# Patient Record
Sex: FEMALE | Race: OTHER | URBAN - METROPOLITAN AREA
[De-identification: names, ages, dates, MRNs, and addresses within clinical notes are randomized per-mention and may not be internally consistent; named-entity substitution may affect disease eponyms.]

---

## 2020-07-22 ENCOUNTER — EMERGENCY (EMERGENCY)
Facility: HOSPITAL | Age: 60
LOS: 1 days | Discharge: HOME | End: 2020-07-22
Attending: EMERGENCY MEDICINE
Payer: MEDICAID

## 2020-07-22 VITALS
WEIGHT: 125 LBS | SYSTOLIC BLOOD PRESSURE: 166 MMHG | OXYGEN SATURATION: 97 % | HEART RATE: 82 BPM | HEIGHT: 65 IN | RESPIRATION RATE: 22 BRPM | DIASTOLIC BLOOD PRESSURE: 110 MMHG | TEMPERATURE: 98 F

## 2020-07-22 VITALS
RESPIRATION RATE: 19 BRPM | SYSTOLIC BLOOD PRESSURE: 137 MMHG | OXYGEN SATURATION: 94 % | HEART RATE: 86 BPM | DIASTOLIC BLOOD PRESSURE: 87 MMHG

## 2020-07-22 LAB
ALBUMIN SERPL ELPH-MCNC: 3.5 G/DL — SIGNIFICANT CHANGE UP (ref 3.5–5)
ALP SERPL-CCNC: 100 U/L — SIGNIFICANT CHANGE UP (ref 40–120)
ALT FLD-CCNC: 41 U/L DA — SIGNIFICANT CHANGE UP (ref 10–60)
ANION GAP SERPL CALC-SCNC: 5 MMOL/L — SIGNIFICANT CHANGE UP (ref 5–17)
APTT BLD: 33 SEC — SIGNIFICANT CHANGE UP (ref 27.5–35.5)
AST SERPL-CCNC: 43 U/L — HIGH (ref 10–40)
BASOPHILS # BLD AUTO: 0.03 K/UL — SIGNIFICANT CHANGE UP (ref 0–0.2)
BASOPHILS NFR BLD AUTO: 0.4 % — SIGNIFICANT CHANGE UP (ref 0–2)
BILIRUB SERPL-MCNC: 0.3 MG/DL — SIGNIFICANT CHANGE UP (ref 0.2–1.2)
BUN SERPL-MCNC: 9 MG/DL — SIGNIFICANT CHANGE UP (ref 7–18)
CALCIUM SERPL-MCNC: 8.9 MG/DL — SIGNIFICANT CHANGE UP (ref 8.4–10.5)
CHLORIDE SERPL-SCNC: 105 MMOL/L — SIGNIFICANT CHANGE UP (ref 96–108)
CO2 SERPL-SCNC: 27 MMOL/L — SIGNIFICANT CHANGE UP (ref 22–31)
CREAT SERPL-MCNC: 0.75 MG/DL — SIGNIFICANT CHANGE UP (ref 0.5–1.3)
D DIMER BLD IA.RAPID-MCNC: 152 NG/ML DDU — SIGNIFICANT CHANGE UP
EOSINOPHIL # BLD AUTO: 0.39 K/UL — SIGNIFICANT CHANGE UP (ref 0–0.5)
EOSINOPHIL NFR BLD AUTO: 4.9 % — SIGNIFICANT CHANGE UP (ref 0–6)
GLUCOSE SERPL-MCNC: 104 MG/DL — HIGH (ref 70–99)
HCT VFR BLD CALC: 40.2 % — SIGNIFICANT CHANGE UP (ref 34.5–45)
HGB BLD-MCNC: 12.9 G/DL — SIGNIFICANT CHANGE UP (ref 11.5–15.5)
IMM GRANULOCYTES NFR BLD AUTO: 0.3 % — SIGNIFICANT CHANGE UP (ref 0–1.5)
INR BLD: 1.06 RATIO — SIGNIFICANT CHANGE UP (ref 0.88–1.16)
LACTATE SERPL-SCNC: 0.8 MMOL/L — SIGNIFICANT CHANGE UP (ref 0.7–2)
LYMPHOCYTES # BLD AUTO: 1.64 K/UL — SIGNIFICANT CHANGE UP (ref 1–3.3)
LYMPHOCYTES # BLD AUTO: 20.8 % — SIGNIFICANT CHANGE UP (ref 13–44)
MCHC RBC-ENTMCNC: 30.4 PG — SIGNIFICANT CHANGE UP (ref 27–34)
MCHC RBC-ENTMCNC: 32.1 GM/DL — SIGNIFICANT CHANGE UP (ref 32–36)
MCV RBC AUTO: 94.6 FL — SIGNIFICANT CHANGE UP (ref 80–100)
MONOCYTES # BLD AUTO: 0.74 K/UL — SIGNIFICANT CHANGE UP (ref 0–0.9)
MONOCYTES NFR BLD AUTO: 9.4 % — SIGNIFICANT CHANGE UP (ref 2–14)
NEUTROPHILS # BLD AUTO: 5.06 K/UL — SIGNIFICANT CHANGE UP (ref 1.8–7.4)
NEUTROPHILS NFR BLD AUTO: 64.2 % — SIGNIFICANT CHANGE UP (ref 43–77)
NRBC # BLD: 0 /100 WBCS — SIGNIFICANT CHANGE UP (ref 0–0)
PLATELET # BLD AUTO: 269 K/UL — SIGNIFICANT CHANGE UP (ref 150–400)
POTASSIUM SERPL-MCNC: 3.8 MMOL/L — SIGNIFICANT CHANGE UP (ref 3.5–5.3)
POTASSIUM SERPL-SCNC: 3.8 MMOL/L — SIGNIFICANT CHANGE UP (ref 3.5–5.3)
PROT SERPL-MCNC: 7.7 G/DL — SIGNIFICANT CHANGE UP (ref 6–8.3)
PROTHROM AB SERPL-ACNC: 12.4 SEC — SIGNIFICANT CHANGE UP (ref 10.6–13.6)
RBC # BLD: 4.25 M/UL — SIGNIFICANT CHANGE UP (ref 3.8–5.2)
RBC # FLD: 12.8 % — SIGNIFICANT CHANGE UP (ref 10.3–14.5)
SARS-COV-2 RNA SPEC QL NAA+PROBE: SIGNIFICANT CHANGE UP
SODIUM SERPL-SCNC: 137 MMOL/L — SIGNIFICANT CHANGE UP (ref 135–145)
TROPONIN I SERPL-MCNC: <0.015 NG/ML — SIGNIFICANT CHANGE UP (ref 0–0.04)
TROPONIN I SERPL-MCNC: <0.015 NG/ML — SIGNIFICANT CHANGE UP (ref 0–0.04)
WBC # BLD: 7.88 K/UL — SIGNIFICANT CHANGE UP (ref 3.8–10.5)
WBC # FLD AUTO: 7.88 K/UL — SIGNIFICANT CHANGE UP (ref 3.8–10.5)

## 2020-07-22 PROCEDURE — U0003: CPT

## 2020-07-22 PROCEDURE — 96374 THER/PROPH/DIAG INJ IV PUSH: CPT

## 2020-07-22 PROCEDURE — 71045 X-RAY EXAM CHEST 1 VIEW: CPT | Mod: 26

## 2020-07-22 PROCEDURE — 71045 X-RAY EXAM CHEST 1 VIEW: CPT

## 2020-07-22 PROCEDURE — 99285 EMERGENCY DEPT VISIT HI MDM: CPT | Mod: 25

## 2020-07-22 PROCEDURE — 85610 PROTHROMBIN TIME: CPT

## 2020-07-22 PROCEDURE — 80053 COMPREHEN METABOLIC PANEL: CPT

## 2020-07-22 PROCEDURE — 85730 THROMBOPLASTIN TIME PARTIAL: CPT

## 2020-07-22 PROCEDURE — 83605 ASSAY OF LACTIC ACID: CPT

## 2020-07-22 PROCEDURE — 85379 FIBRIN DEGRADATION QUANT: CPT

## 2020-07-22 PROCEDURE — 36415 COLL VENOUS BLD VENIPUNCTURE: CPT

## 2020-07-22 PROCEDURE — 99285 EMERGENCY DEPT VISIT HI MDM: CPT

## 2020-07-22 PROCEDURE — 94640 AIRWAY INHALATION TREATMENT: CPT

## 2020-07-22 PROCEDURE — 87040 BLOOD CULTURE FOR BACTERIA: CPT

## 2020-07-22 PROCEDURE — 85027 COMPLETE CBC AUTOMATED: CPT

## 2020-07-22 PROCEDURE — 93005 ELECTROCARDIOGRAM TRACING: CPT

## 2020-07-22 PROCEDURE — 84484 ASSAY OF TROPONIN QUANT: CPT

## 2020-07-22 RX ORDER — AZITHROMYCIN 500 MG/1
1 TABLET, FILM COATED ORAL
Qty: 3 | Refills: 0
Start: 2020-07-22

## 2020-07-22 RX ORDER — FLUOXETINE HCL 10 MG
1 CAPSULE ORAL
Qty: 30 | Refills: 0
Start: 2020-07-22 | End: 2020-08-20

## 2020-07-22 RX ORDER — LAMOTRIGINE 25 MG/1
1 TABLET, ORALLY DISINTEGRATING ORAL
Qty: 60 | Refills: 0
Start: 2020-07-22 | End: 2020-08-20

## 2020-07-22 RX ORDER — NICOTINE POLACRILEX 2 MG
1 GUM BUCCAL
Qty: 21 | Refills: 0
Start: 2020-07-22

## 2020-07-22 RX ORDER — IPRATROPIUM/ALBUTEROL SULFATE 18-103MCG
3 AEROSOL WITH ADAPTER (GRAM) INHALATION ONCE
Refills: 0 | Status: COMPLETED | OUTPATIENT
Start: 2020-07-22 | End: 2020-07-22

## 2020-07-22 RX ORDER — SUMATRIPTAN SUCCINATE 4 MG/.5ML
1 INJECTION, SOLUTION SUBCUTANEOUS
Qty: 30 | Refills: 0
Start: 2020-07-22

## 2020-07-22 RX ORDER — DEXTROMETHORPHAN POLISTIREX 30 MG/5 ML
10 SUSPENSION, EXTENDED RELEASE 12 HR ORAL
Qty: 30 | Refills: 0
Start: 2020-07-22

## 2020-07-22 RX ORDER — IPRATROPIUM/ALBUTEROL SULFATE 18-103MCG
3 AEROSOL WITH ADAPTER (GRAM) INHALATION
Qty: 1 | Refills: 0
Start: 2020-07-22 | End: 2020-08-20

## 2020-07-22 RX ORDER — GABAPENTIN 400 MG/1
300 CAPSULE ORAL
Qty: 6000 | Refills: 0
Start: 2020-07-22 | End: 2020-07-31

## 2020-07-22 RX ORDER — ALBUTEROL 90 UG/1
3 AEROSOL, METERED ORAL
Qty: 90 | Refills: 0
Start: 2020-07-22 | End: 2020-07-31

## 2020-07-22 RX ORDER — FLUTICASONE PROPIONATE AND SALMETEROL 50; 250 UG/1; UG/1
1 POWDER ORAL; RESPIRATORY (INHALATION)
Qty: 60 | Refills: 0
Start: 2020-07-22 | End: 2020-08-20

## 2020-07-22 RX ADMIN — Medication 3 MILLILITER(S): at 10:09

## 2020-07-22 RX ADMIN — Medication 125 MILLIGRAM(S): at 06:32

## 2020-07-22 RX ADMIN — Medication 3 MILLILITER(S): at 06:31

## 2020-07-22 RX ADMIN — Medication 3 MILLILITER(S): at 08:18

## 2020-07-22 NOTE — ED PROVIDER NOTE - PROGRESS NOTE DETAILS
02 93% but in no distress, walking in ER, requesting to leave, lungs are clear.  will refer to pulmonly for copd

## 2020-07-22 NOTE — ED PROVIDER NOTE - PATIENT PORTAL LINK FT
You can access the FollowMyHealth Patient Portal offered by [facility] by registering at the following website: [email or web address]. By joining [facility]’s FollowMyHealth portal, you will also be able to view your health information using other applications (apps) compatible with our system.

## 2020-07-22 NOTE — ED PROVIDER NOTE - CLINICAL SUMMARY MEDICAL DECISION MAKING FREE TEXT BOX
755a- Pt feels better, wheezing diminishing, no respiratory distress, no retractions.   S.O. to Dr. Francisco, reeval.

## 2020-07-27 LAB
CULTURE RESULTS: SIGNIFICANT CHANGE UP
CULTURE RESULTS: SIGNIFICANT CHANGE UP
SPECIMEN SOURCE: SIGNIFICANT CHANGE UP
SPECIMEN SOURCE: SIGNIFICANT CHANGE UP

## 2021-03-22 ENCOUNTER — EMERGENCY (EMERGENCY)
Facility: HOSPITAL | Age: 61
LOS: 1 days | Discharge: HOME | End: 2021-03-22
Attending: EMERGENCY MEDICINE
Payer: MEDICAID

## 2021-03-22 VITALS
TEMPERATURE: 98 F | RESPIRATION RATE: 16 BRPM | OXYGEN SATURATION: 97 % | HEART RATE: 97 BPM | DIASTOLIC BLOOD PRESSURE: 90 MMHG | SYSTOLIC BLOOD PRESSURE: 144 MMHG | WEIGHT: 113.98 LBS

## 2021-03-22 PROCEDURE — 99284 EMERGENCY DEPT VISIT MOD MDM: CPT

## 2021-03-22 RX ORDER — TETANUS TOXOID, REDUCED DIPHTHERIA TOXOID AND ACELLULAR PERTUSSIS VACCINE, ADSORBED 5; 2.5; 8; 8; 2.5 [IU]/.5ML; [IU]/.5ML; UG/.5ML; UG/.5ML; UG/.5ML
0.5 SUSPENSION INTRAMUSCULAR ONCE
Refills: 0 | Status: COMPLETED | OUTPATIENT
Start: 2021-03-22 | End: 2021-03-22

## 2021-03-22 RX ORDER — MUPIROCIN 20 MG/G
1 OINTMENT TOPICAL ONCE
Refills: 0 | Status: COMPLETED | OUTPATIENT
Start: 2021-03-22 | End: 2021-03-22

## 2021-03-22 RX ADMIN — Medication 1 TABLET(S): at 23:18

## 2021-03-22 RX ADMIN — TETANUS TOXOID, REDUCED DIPHTHERIA TOXOID AND ACELLULAR PERTUSSIS VACCINE, ADSORBED 0.5 MILLILITER(S): 5; 2.5; 8; 8; 2.5 SUSPENSION INTRAMUSCULAR at 23:18

## 2021-03-22 RX ADMIN — Medication 500 MILLIGRAM(S): at 23:18

## 2021-03-22 RX ADMIN — MUPIROCIN 1 APPLICATION(S): 20 OINTMENT TOPICAL at 23:12

## 2021-03-22 NOTE — ED PROVIDER NOTE - PATIENT PORTAL LINK FT
You can access the FollowMyHealth Patient Portal offered by [facility] by registering at the following website: [email or web address]. By joining [employer]’s FollowMyHealth portal, you will also be able to view your health information using other applications (apps) compatible with our system.

## 2021-03-22 NOTE — ED ADULT NURSE NOTE - CADM POA CENTRAL LINE
Patient : Sinai Samuel Age: 62 year old Sex: female   MRN: [id number] Encounter Date: 4/26/2020      History     Chief Complaint   Patient presents with   • Shortness of Breath   • Cough     HPI  Sinai Samuel is a 61 y/o female with a h/o CAD who presents to the ED with c/o shortness of breath and chest tightness. Pt notes that she felt well yesterday, however, upon waking up today she felt short of breath. She experiences dyspnea on exertion. She reports her chest feels \"tight\" and she has pain with taking a deep breath. Associated sx include body aches and a dry cough. She denies chest pain, fever, chills, nausea, vomiting, diarrhea, congestion, rhinorrhea, sore throat, abdominal pain, lower extremity edema, or other symptoms. 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 She reports she had an NSTEMI within the past year. She is on a diuretic. Denies a h/o asthma or COPD. She does not smoke. There are no other associated symptoms or modifying factors at this time.     Chart Review: Pt had an NSTEMI in September of 2019. She had an ECHO in October of 2019 and EF is noted to be 53%. She takes 20mg of lasix BID.     Allergies   Allergen Reactions   • Codeine Sulfate CARDIAC DISTURBANCES     Tachycardia   • Lisinopril Cough       Discharge Medication List as of 4/26/2020  5:36 PM      Prior to Admission Medications    Details   Vitamin D, Ergocalciferol, 1.25 mg (50,000 units) capsule TAKE ONE CAPSULE BY MOUTH 1 DAY A WEEKEprescribe, Disp-8 capsule, R-0Per the FDA, the units of measure for vitamin D have changed from international units (IUs) to metric units (eg, micrograms or milligrams). It is advised to order in metric units. 1.2 5 mg = 50,000 units      furosemide (LASIX) 20 MG tablet TAKE 1 TABLET BY MOUTH TWICE DAILYEprescribe, Disp-180 tablet, R-1      acamprosate (CAMPRAL EC) 333 MG tablet TAKE 1 TABLET BY MOUTH THREE TIMES DAILY.Eprescribe, Disp-90 tablet, R-1      metoPROLOL  succinate (TOPROL-XL) 25 MG 24 hr tablet Take 1 tablet by mouth daily.Eprescribe, Disp-30 tablet, R-3      disulfiram (ANTABUSE) 250 MG tablet TAKE 1 TABLET BY MOUTH DAILYEprescribe, Disp-60 tablet, R-0      ASPIRIN LOW DOSE 81 MG EC tablet TK 1 T PO DHistorical Med, R-3, JAIR      atorvastatin (LIPITOR) 20 MG tablet Take 1 tablet by mouth daily.Eprescribe, Disp-90 tablet, R-3      ibuprofen (MOTRIN) 200 MG tablet Take 200 mg by mouth every 6 hours as needed for Pain (arthritis). Indications: arthritis Historical Med, Disp-30 tablet, R-0             Discharge Medication List as of 4/26/2020  5:36 PM          Past Medical History:   Diagnosis Date   • Dyslipidemia    • Endometriosis 08/14/1998    3 Lupron Depot injections 2002   • Hemorrhoids    • Human papillomavirus in conditions classified elsewhere and of unspecified site     warts occasionally   • Hypertension     borderline   • Ovarian Cyst 08/14/1998    Hx of bilateral ovarian cysts. 2 cysts noted left ovary per Ultrasound 12/07/2007       Past Surgical History:   Procedure Laterality Date   • BIOPSY OR EXCISE CERVICAL LESION  07/22/2002    Benign cervical polyp   • COLONOSCOPY  2010    reportedly normal   • HYSTEROSCOPY,BIO ENDO/POLYPTMY  12/10    polyp   • LAPAROSCOPY,ABDM,NACHO,OMENT,DX  08/14/1998    Laparoscopic right ovarian cystectomy, removal of endometrioma and endometriosis, CO2 lser excision of ROQUE and HPV.   • LAPAROSCOPY,LYSIS ADHESNS  12/10    ovary R side 3 pieces       Family History   Problem Relation Age of Onset   • Cancer Sister         Cervical  dx age 45       Social History     Tobacco Use   • Smoking status: Never Smoker   • Smokeless tobacco: Never Used   Substance Use Topics   • Alcohol use: No     Alcohol/week: 0.0 standard drinks   • Drug use: No       Review of Systems   Constitutional: Negative for chills and fever.   HENT: Negative for congestion and rhinorrhea.    Respiratory: Positive for cough (dry), chest tightness and  shortness of breath.    Cardiovascular: Negative for chest pain and leg swelling.   Gastrointestinal: Negative for abdominal pain, nausea and vomiting.   Genitourinary: Negative for dysuria and frequency.   Musculoskeletal: Negative for arthralgias and myalgias.   Skin: Negative for color change and pallor.   Allergic/Immunologic: Negative for immunocompromised state.   Neurological: Negative for dizziness and light-headedness.       Physical Exam     ED Triage Vitals   ED Triage Vitals Group      Temp 04/26/20 1425 98.6 °F (37 °C)      Heart Rate 04/26/20 1425 92      Resp 04/26/20 1427 (!) 24      BP 04/26/20 1425 (!) 184/84      SpO2 04/26/20 1425 95 %      EtCO2 mmHg --       Height 04/26/20 1425 5' 8\" (1.727 m)      Weight 04/26/20 1425 (!) 311 lb 11.7 oz (141.4 kg)      Weight Scale Used --       BMI (Calculated) 04/26/20 1425 47.4      IBW/kg (Calculated) 04/26/20 1425 63.9       Physical Exam   Constitutional: She appears well-developed and well-nourished. No distress.   morbidly obese   HENT:   Head: Normocephalic and atraumatic.   Right Ear: External ear normal.   Left Ear: External ear normal.   Eyes: Pupils are equal, round, and reactive to light. Lids are normal. No scleral icterus.   Neck: Neck supple.   Cardiovascular: Normal rate, regular rhythm, normal heart sounds and intact distal pulses.   Pulmonary/Chest: Effort normal. No respiratory distress. She has wheezes (slight). She has no rales. She exhibits no tenderness.   Abdominal: There is no abdominal tenderness.   Musculoskeletal:      Comments: No lower extremity edema or calf tenderness bilaterally.   Neurological: She is alert.   Skin: Skin is warm and dry. She is not diaphoretic. No erythema. No pallor.   Psychiatric: She has a normal mood and affect.   Nursing note and vitals reviewed.      ED Course     Procedures    Lab Results     Results for orders placed or performed during the hospital encounter of 04/26/20   Comprehensive Metabolic  Panel   Result Value Ref Range    Fasting Status      Sodium 138 135 - 145 mmol/L    Potassium 3.7 3.4 - 5.1 mmol/L    Chloride 105 98 - 107 mmol/L    Carbon Dioxide 26 21 - 32 mmol/L    Anion Gap 11 10 - 20 mmol/L    Glucose 123 (H) 65 - 99 mg/dL    BUN 14 6 - 20 mg/dL    Creatinine 0.65 0.51 - 0.95 mg/dL    Glomerular Filtration Rate >90 >90    BUN/ Creatinine Ratio 22 7 - 25    Bilirubin, Total 1.3 (H) 0.2 - 1.0 mg/dL    GOT/AST 17 <=37 Units/L    Alkaline Phosphatase 103 45 - 117 Units/L    Albumin 3.4 (L) 3.6 - 5.1 g/dL    Protein, Total 7.0 6.4 - 8.2 g/dL    Globulin 3.6 2.0 - 4.0 g/dL    A/G Ratio 0.9 (L) 1.0 - 2.4    GPT/ALT 17 <64 Units/L    Calcium 8.9 8.4 - 10.2 mg/dL   NT proBNP   Result Value Ref Range    NT-proBNP 298 (H) <=125 pg/mL   D Dimer, Quantitative   Result Value Ref Range    D Dimer, Quantitative 0.47 <0.57 mg/L (FEU)   C Reactive Protein   Result Value Ref Range    C-Reactive Protein 2.9 (H) <=1.0 mg/dL   Procalcitonin   Result Value Ref Range    Procalcitonin 0.07 <=0.09 ng/mL   Lactate Dehydrogenase   Result Value Ref Range    LD, Total 230 82 - 240 Units/L   Ferritin   Result Value Ref Range    Ferritin 6 (L) 8 - 252 ng/mL   Creatine Kinase   Result Value Ref Range    CK 89 26 - 192 Units/L   CBC with Automated Differential (performable only)   Result Value Ref Range    WBC 15.6 (H) 4.2 - 11.0 K/mcL    RBC 5.72 (H) 4.00 - 5.20 mil/mcL    HGB 10.7 (L) 12.0 - 15.5 g/dL    HCT 35.3 (L) 36.0 - 46.5 %    MCV 61.7 (L) 78.0 - 100.0 fl    MCH 18.7 (L) 26.0 - 34.0 pg    MCHC 30.3 (L) 32.0 - 36.5 g/dL    RDW-CV 19.2 (H) 11.0 - 15.0 %     140 - 450 K/mcL    NRBC 0 <=0 /100 WBC    Neutrophil, Percent 85 %    Lymphocytes, Percent 8 %    Mono, Percent 6 %    Eosinophils, Percent 0 %    Basophils, Percent 1 %    Immature Granulocytes 0 %    Absolute Neutrophils 13.3 (H) 1.8 - 7.7 K/mcL    Absolute Lymphocytes 1.2 1.0 - 4.0 K/mcL    Absolute Monocytes 0.9 0.3 - 0.9 K/mcL    Absolute Eosinophils   0.1 0.1 - 0.5 K/mcL    Absolute Basophils 0.1 0.0 - 0.3 K/mcL    Absolute Immmature Granulocytes 0.1 0.0 - 0.2 K/mcL    RDW-SD 37.5 (L) 39.0 - 50.0 fL   Rapid SARS-CoV-2 by PCR   Result Value Ref Range    Rapid SARS-COV-2 by PCR Not Detected Not Detected    Isolation Guidelines     Creatinine (POC)   Result Value Ref Range    Creatinine 0.60 0.51 - 0.95 mg/dL    Glomerular Filtration Rate >90 >90   TROPONIN I  POINT OF CARE   Result Value Ref Range    TROPONIN I - POINT OF CARE <0.10 <0.10 ng/mL   BLOOD GAS, VENOUS -POINT OF CARE   Result Value Ref Range    PH, VENOUS - POINT OF CARE 7.46 (H) 7.35 - 7.45 Units    PCO2, VENOUS - POINT OF CARE 37 (L) 38 - 51 mm Hg    PO2, VENOUS - POINT OF CARE 66 (H) 35 - 42 mm Hg    HCO3, VENOUS - POINT OF CARE 26 22 - 28 mmol/L    BASE EXCESS / DEFICIT, VENOUS - POINT OF CARE 2 -2 - 2 mmol/L    O2 SATURATION, VENOUS - POINT OF CARE 94 (H) 60 - 80 %    TCO2 - POINT OF CARE 27 (H) 19 - 24 mmol/L       EKG Results       Radiology Results     Imaging Results          XR Chest Lateral View Only (Final result)  Result time 04/26/20 17:19:42    Final result                 Impression:    FINDINGS/IMPRESSION:  The previously questioned ill-defined oval nodular  density projecting the medial right upper lobe is not readily identified on  the lateral view obtained for this study.     Multilevel thoracolumbar spondylosis is seen with  loss of height of  several thoracic vertebral bodies noted, which were seen on the earlier CT  9/27/2019 chest exam.                 Narrative:    EXAM: XR CHEST LATERAL VIEW ONLY    HISTORY:  abnormal CXR, further evaluate    COMPARISON:  Earlier chest x-ray 4/26/2020                               XR CHEST AP OR PA - PORTABLE (Final result)  Result time 04/26/20 15:05:01    Final result                 Impression:    IMPRESSION: Ill-defined oval nodular density projecting in the medial right  upper lobe felt to be likely overlying bony structure although  asymmetric  with the left side and also not clearly seen on prior exams or on the  recent chest CT in September, 2019. Suggest follow-up PA and lateral chest,  when the patient is able, to see if this density does persist.    Otherwise, no acute disease               Narrative:    EXAM: XR CHEST AP OR PA    HISTORY:  sob    COMPARISON: 5/24/2016, 9/27/2019.     FINDINGS: Cardiac size is at the upper limits of normal.   EKG leads  overlie the chest.  There is an asymmetric oval ill-defined opacity projecting in the medial  right upper lobe. Lung fields are otherwise clear. No pleural fluid.                                  ED Medication Orders (From admission, onward)    Ordered Start     Status Ordering Provider    04/26/20 1615 04/26/20 1615  albuterol inhaler 6 puff  ONCE      Last MAR action:  Given JEANNINE PEREZ             Initial Impression: 61 y/o female presents with cough, SOB, and chest tightness. Current SpO2 is WNL. Discussed plan for labs and CXR. Pt is agreeable. All questions answered.     4:10 PM I discussed the pt's care with Dr. Choudhury, attending ED physician. We agreed on the plan of care.    5:30 PM I recheck the patient was resting comfortably.  She informed me that she felt improved chest tightness with use of albuterol inhaler.  I updated her on the findings of her workup including no evidence of pneumonia, heart failure, pneumothorax, acute coronary syndrome, pulmonary embolism, or other acute findings.  Discussed that we will test for COVID given her risk factors and symptoms.  I do not feel that she requires care beyond the ED at this time.  Discussed other plan for treatment at home with Tylenol for fever or body aches in addition to getting plenty of rest and drinking plenty of fluids.  I discussed closed follow-up with PCP recommendations as well as detailed ED return precautions for any worsening symptoms.  The patient expresses her understanding.  All questions answered.    5:54  PM I was informed by lab that they accidentally ran the COVID test as a \"rapid\" test.       MDM  62-year-old female presented to the emergency department with complaints of chest tightness, cough, and shortness of Breath that started this morning.  She had very mild wheezing on exam that he had a normal SpO2.  Given her symptoms, may main concern was for COVID-19 infection, however, I also considered a PE, Congestive heart failure, ACS, or bacterial pneumonia as possible etiologies of her symptoms.  Her workup was essentially unremarkable with a negative chest x-ray, normal D-dimer (I feel a PE has been adequately excluded given lack of recent risk factors), no evidence of Congestive heart failure, and no acute cardiac findings.  The patient did have some improvement in her symptoms with an albuterol inhaler.  As she was able to ambulate with a normal SpO2, I do not feel that she required further care beyond the ED.  I do suspect her symptoms are consistent with a viral syndrome, including but not limited to COVID-19.  I discussed further plan of care as well as follow-up recommendations and ED return precautions with the patient.  She expressed her understanding.  Clinical Impression     ED Diagnosis   1. Chest tightness     2. Viral syndrome         Disposition        Discharge 4/26/2020  5:36 PM  Sinai Samuel discharge to home/self care.           Bethany Antonio PA-C  04/26/20 3011     No

## 2021-03-22 NOTE — ED PROVIDER NOTE - CLINICAL SUMMARY MEDICAL DECISION MAKING FREE TEXT BOX
59 y/o female h/o COPD, HTN, arthritis, presents with cat bite. Will give TDAP, Augmentin, and Naproxen. Will fill out [place] animal bite form to have health department f/u with pt. 59 y/o female h/o COPD, HTN, arthritis, presents with cat bite. Will give TDAP, Augmentin, and Naproxen. [place] animal bite online form completed. patient instructed to return in 2-3 days for reevaluation of wounds.

## 2021-03-22 NOTE — ED ADULT NURSE NOTE - NSIMPLEMENTINTERV_GEN_ALL_ED
Wheelchair/Stroller
Implemented All Universal Safety Interventions:  [place] to call system. Call bell, personal items and telephone within reach. Instruct patient to call for assistance. Room bathroom lighting operational. Non-slip footwear when patient is off stretcher. Physically safe environment: no spills, clutter or unnecessary equipment. Stretcher in lowest position, wheels locked, appropriate side rails in place.

## 2021-03-22 NOTE — ED ADULT TRIAGE NOTE - SPO2 (%)
Monika Beck is a 16 year old female presents today for   Chief Complaint   Patient presents with   • Wrist Pain     R, x 3 days      Monika Beck is a 16 year old female presenting with right wrist pain x 3 days. Patient denies injury. Patient states the pain started suddenly. She states it is painful with any movement. OTC ibuprofen 600 mg at 0700.    ALLERGIES:   Allergen Reactions   • Codeine GI UPSET     vomits   • Egg Yolks [Egg Yolk] Muscle Spasm     Chest tighting       Denies Latex allergy or sensitivity    Medications: medications verified and updated    PCP: Domenico Whitfield MD  There were no vitals taken for this visit.    Patient here with father    Up to date with Immunizations: Yes    Patient would like communication of their results via:        Cell Phone:   Telephone Information:   Mobile [phone]     Okay to leave a message containing results? Yes    Health Maintenance Due   Topic Date Due   • Hepatitis B Vaccine (1 of 3 - Primary Series) 1999   • IPV Vaccine (1 of 4 - All-IPV Series) 1999   • MMR Vaccine (1 of 2) 10/07/2000   • Hepatitis A Vaccine (1 of 2 - Standard Series) 10/07/2000   • DTaP/Tdap/Td Vaccine (1 - Tdap) 10/07/2006   • HPV (Female) Vaccine (1 of 3 - Female 3 Dose Series) 10/07/2010   • Varicella Vaccine (1 of 2 - 2 Dose Adolescent Series) 10/07/2012   • Meningococcal Vaccine (1 of 1) 10/07/2015      97

## 2021-03-22 NOTE — ED PROVIDER NOTE - SKIN, MLM
L forearm with multiple puncture wounds and superficial lacerations over the volar and dorsal aspect of the forearm, and the distal volar aspect has a 3cm area of erythema and mild tenderness to palpation L forearm with multiple puncture wounds and superficial abrasions over the volar and dorsal aspect of the forearm, no open wound, no active bleeding, and the distal volar aspect has a 3cm area of erythema and mild tenderness to palpation, no significant swelling

## 2021-03-22 NOTE — ED ADULT NURSE NOTE - OBJECTIVE STATEMENT
came to Ed due to multiple cat bite superficial to left forearm this morning. Seen and examined by Dr Nelson, meds given as ordered, wound care given.

## 2021-03-22 NOTE — ED ADULT TRIAGE NOTE - CHIEF COMPLAINT QUOTE
I was bitten by my cat on my left arm today I was bitten by my cat on my left arm today. Cat was not vaccinated

## 2021-03-22 NOTE — ED PROVIDER NOTE - NSFOLLOWUPINSTRUCTIONS_ED_ALL_ED_FT
take medication as prescribed.  Apply antibiotic ointment twice daily to wounds to prevent infection.  Return to ED in 2-3 days for reevaluation of wounds or sooner if you notice increased redness or pus from wounds.      Animal Bite    WHAT YOU NEED TO KNOW:    Animal bite injuries range from shallow cuts to deep, life-threatening wounds. An animal can cut or puncture the skin when it bites. Your skin may be torn from your body. Your skin may swell or bruise even if the bite does not break the skin. Animal bites occur more often on the hands, arms, legs, and face. Bites from dogs and cats are the most common injuries.    DISCHARGE INSTRUCTIONS:    Return to the emergency department if:   •You have a fever.      •Your wound is red, swollen, and draining pus.      •You see red streaks on the skin around the wound.      •You can no longer move the bitten area.      •Your heartbeat and breathing are much faster than usual.      •You feel dizzy and confused.      Contact your healthcare provider if:   •Your pain does not get better, even after you take pain medicine.      •You have nightmares or flashbacks about the animal bite.       •You have questions or concerns about your condition or care.      Medicines: You may need any of the following:   •Antibiotics prevent or treat a bacterial infection.      •Prescription pain medicine may be given. Ask how to take this medicine safely.      •A tetanus vaccine may be needed to prevent tetanus. Tetanus is a life-threatening bacterial infection that affects the nerves and muscles. The bacteria can be spread through animal bites.       •A rabies vaccine may be needed to prevent rabies. Rabies is a life-threatening viral infection. The virus can be spread through animal bites.      •Take your medicine as directed. Contact your healthcare provider if you think your medicine is not helping or if you have side effects. Tell him of her if you are allergic to any medicine. Keep a list of the medicines, vitamins, and herbs you take. Include the amounts, and when and why you take them. Bring the list or the pill bottles to follow-up visits. Carry your medicine list with you in case of an emergency.      Follow up with your healthcare provider in 1 to 2 days: You may need to return to have your stitches removed. Write down your questions so you remember to ask them during your visits.    Self-care:   •Apply antibiotic ointment as directed. This helps prevent infection in minor skin wounds. It is available without a doctor's order.      •Keep the wound clean and covered. Wash the wound every day with soap and water or germ-killing cleanser. Ask your healthcare provider about the kinds of bandages to use.       •Apply ice on your wound. Ice helps decrease swelling and pain. Ice may also help prevent tissue damage. Use an ice pack, or put crushed ice in a plastic bag. Cover it with a towel and place it on your wound for 15 to 20 minutes every hour or as directed.      •Elevate the wound area. Raise your wound above the level of your heart as often as you can. This will help decrease swelling and pain. Prop your wound on pillows or blankets to keep it elevated comfortably.       Prevent another animal bite:   •Learn to recognize the signs of a scared or angry pet. Avoid quick, sudden movements.      •Do not step between animals that are fighting.      •Do not leave a pet alone with a young child.      •Do not disturb an animal while it eats, sleeps, or cares for its young.      •Do not approach an animal you do not know, especially one that is tied up or caged.      •Stay away from animals that seem sick or act strangely.      •Do not feed or capture wild animals

## 2021-03-22 NOTE — ED PROVIDER NOTE - OBJECTIVE STATEMENT
61 y/o female h/o COPD, HTN, arthritis, presents with cat bite to her L forearm. Pt reports about 2 hours ago she was trying to bring her own cat into her home from the porch and the cat was refusing to come inside, so she picked the cat up and the cat bit her multiple times on the L forearm. Pt reports the cat is 17 months old and is yet to be vaccinated because of the pandemic in the past year. Pt reports that this was a provoked attack due to the cat not being happy about being brought indoors. Denies any other states. Pt reports she cleansed the injured area with alcohol prior to coming to the ED.

## 2022-07-21 NOTE — ED ADULT NURSE NOTE - NS PRO PASSIVE SMOKE EXP
[facility] [address] - NEUROLOGY 7TH FL [facility], [place]    Date: 22  Patient Name: Sirisha Suggs   MRN: [id number]   PCP: Mandeep Gamez  Referring Provider: Zheng Monreal MD    Assessment:   Sirisha Suggs No

## 2023-07-25 NOTE — ED PROVIDER NOTE - HISTORY ATTESTATION, MLM
To prescribe medication as prescribed only  Get established with a primary care provider. Give their office a call tomorrow. Follow-up. You may continue the [place]. Half tablet once a day for the next 2 days. show I have reviewed and confirmed nurses' notes...